# Patient Record
Sex: FEMALE | Race: WHITE | URBAN - METROPOLITAN AREA
[De-identification: names, ages, dates, MRNs, and addresses within clinical notes are randomized per-mention and may not be internally consistent; named-entity substitution may affect disease eponyms.]

---

## 2017-02-24 ENCOUNTER — OFFICE VISIT (OUTPATIENT)
Dept: FAMILY MEDICINE CLINIC | Facility: CLINIC | Age: 32
End: 2017-02-24

## 2017-02-24 VITALS
HEIGHT: 57 IN | DIASTOLIC BLOOD PRESSURE: 84 MMHG | HEART RATE: 97 BPM | WEIGHT: 183 LBS | RESPIRATION RATE: 18 BRPM | BODY MASS INDEX: 39.48 KG/M2 | SYSTOLIC BLOOD PRESSURE: 116 MMHG | OXYGEN SATURATION: 98 % | TEMPERATURE: 99 F

## 2017-02-24 DIAGNOSIS — L02.429: Primary | ICD-10-CM

## 2017-02-24 PROCEDURE — 99203 OFFICE O/P NEW LOW 30 MIN: CPT | Performed by: PHYSICIAN ASSISTANT

## 2017-02-24 RX ORDER — ERGOCALCIFEROL (VITAMIN D2) 10 MCG
TABLET ORAL
COMMUNITY

## 2017-02-24 RX ORDER — CEPHALEXIN 500 MG/1
500 CAPSULE ORAL 3 TIMES DAILY
Qty: 30 CAPSULE | Refills: 0 | Status: SHIPPED | OUTPATIENT
Start: 2017-02-24 | End: 2017-02-24

## 2017-02-24 RX ORDER — CEPHALEXIN 500 MG/1
500 CAPSULE ORAL 3 TIMES DAILY
Qty: 30 CAPSULE | Refills: 0 | Status: SHIPPED | OUTPATIENT
Start: 2017-02-24

## 2017-02-24 NOTE — PROGRESS NOTES
HPI:    Patient ID: Ebenezer Booth is a 36year old female. Pt is a new patient to our clinic. She is 36 weeks pregnant. HPI  Pt presents to clinic with skin lesion of left elbow. First appeared yesterday and came to a head today.  Some pain in her symptoms change or worsen over the weekend. - cephALEXin 500 MG Oral Cap; Take 1 capsule (500 mg total) by mouth 3 (three) times daily. Dispense: 30 capsule; Refill: 0    Pt's questions answered to satisfaction.  She verbalized understanding and is in ag

## 2017-03-12 ENCOUNTER — HOSPITAL (OUTPATIENT)
Dept: OTHER | Age: 32
End: 2017-03-12
Attending: OBSTETRICS & GYNECOLOGY

## 2017-03-13 ENCOUNTER — HOSPITAL (OUTPATIENT)
Dept: OTHER | Age: 32
End: 2017-03-13
Attending: OBSTETRICS & GYNECOLOGY

## 2017-03-13 LAB
ANALYZER ANC (IANC): ABNORMAL
BASE DEFICIT BLDCOA-SCNC: 8 MMOL/L
BASE DEFICIT BLDCOV-SCNC: 8 MMOL/L
BASE EXCESS BLDCOA CALC-SCNC: ABNORMAL MMOL/L
BASE EXCESS-RC: ABNORMAL
BASOPHILS # BLD: 0 THOUSAND/MCL (ref 0–0.3)
BASOPHILS NFR BLD: 0 %
DIFFERENTIAL METHOD BLD: ABNORMAL
EOSINOPHIL # BLD: 0.1 THOUSAND/MCL (ref 0.1–0.5)
EOSINOPHIL NFR BLD: 1 %
ERYTHROCYTE [DISTWIDTH] IN BLOOD: 12.5 % (ref 11–15)
HCO3 BLDCOA-SCNC: 24 MMOL/L (ref 21–28)
HCO3 BLDCOV-SCNC: 21 MMOL/L (ref 22–29)
HEMATOCRIT: 36.6 % (ref 36–46.5)
HGB BLD-MCNC: 12.9 GM/DL (ref 12–15.5)
LYMPHOCYTES # BLD: 1.5 THOUSAND/MCL (ref 1–4.8)
LYMPHOCYTES NFR BLD: 11 %
MCH RBC QN AUTO: 32.7 PG (ref 26–34)
MCHC RBC AUTO-ENTMCNC: 35.2 GM/DL (ref 32–36.5)
MCV RBC AUTO: 92.7 FL (ref 78–100)
MONOCYTES # BLD: 0.9 THOUSAND/MCL (ref 0.3–0.9)
MONOCYTES NFR BLD: 6 %
NEUTROPHILS # BLD: 11.2 THOUSAND/MCL (ref 1.8–7.7)
NEUTROPHILS NFR BLD: 82 %
NEUTS SEG NFR BLD: ABNORMAL %
PCO2 BLDCOA: 86 MM HG (ref 31–74)
PCO2 BLDCOV: 59 MM HG (ref 23–49)
PERCENT NRBC: ABNORMAL
PH BLDCOA: 7.06 UNIT (ref 7.18–7.38)
PH BLDCOV: 7.16 UNIT (ref 7.25–7.45)
PLATELET # BLD: 209 THOUSAND/MCL (ref 140–450)
PO2 BLDCOV: 26 MM HG (ref 17–41)
PO2 RC ARTERIAL CORD (RACPO): 21 MM HG (ref 6–31)
RBC # BLD: 3.95 MILLION/MCL (ref 4–5.2)
WBC # BLD: 13.7 THOUSAND/MCL (ref 4.2–11)

## 2017-03-14 LAB
ANALYZER ANC (IANC): ABNORMAL
ERYTHROCYTE [DISTWIDTH] IN BLOOD: 12.9 % (ref 11–15)
HEMATOCRIT: 32.7 % (ref 36–46.5)
HGB BLD-MCNC: 11.1 GM/DL (ref 12–15.5)
MCH RBC QN AUTO: 31.9 PG (ref 26–34)
MCHC RBC AUTO-ENTMCNC: 33.9 GM/DL (ref 32–36.5)
MCV RBC AUTO: 94 FL (ref 78–100)
PLATELET # BLD: 197 THOUSAND/MCL (ref 140–450)
RBC # BLD: 3.48 MILLION/MCL (ref 4–5.2)
WBC # BLD: 11.6 THOUSAND/MCL (ref 4.2–11)

## 2017-03-21 ENCOUNTER — HOSPITAL (OUTPATIENT)
Dept: OTHER | Age: 32
End: 2017-03-21
Attending: OBSTETRICS & GYNECOLOGY

## 2017-04-01 ENCOUNTER — HOSPITAL (OUTPATIENT)
Dept: OTHER | Age: 32
End: 2017-04-01
Attending: OBSTETRICS & GYNECOLOGY

## 2017-04-17 RX ORDER — METOCLOPRAMIDE 5 MG/1
5 TABLET ORAL
Qty: 90 TABLET | Refills: 1 | Status: SHIPPED | OUTPATIENT
Start: 2017-04-17

## 2017-07-18 ENCOUNTER — TELEPHONE (OUTPATIENT)
Dept: FAMILY MEDICINE CLINIC | Facility: CLINIC | Age: 32
End: 2017-07-18

## 2017-07-18 NOTE — TELEPHONE ENCOUNTER
Spoke with patient, 1-2 soft stools daily \"kade loose\". No abdominal pain, no fever, no blood, no Nausea or vomiting. Lives in [place].  Advised to get established with PCP there, monitor closely and seek evaluation if symptoms increase or worse

## 2017-07-18 NOTE — TELEPHONE ENCOUNTER
Pt called stated she had her appendix removed on May 14th and ever since her vowel movements have been very soft or diarrhea like, pt is concerned she hasn't had a solid vowel movement. Is this normal? Pt will like to further discuss with the nurse.  Please

## 2017-07-18 NOTE — TELEPHONE ENCOUNTER
Pt called back returning a call for the nurse, per triage nurse to take a message and notify pt we will call back. please call pt back and advise.

## 2017-07-18 NOTE — TELEPHONE ENCOUNTER
[facility] requesting a return call. If abdominal pain, fever, consistently loose stools, an evaluation best course.

## 2017-11-27 ENCOUNTER — TELEPHONE (OUTPATIENT)
Dept: FAMILY MEDICINE CLINIC | Facility: CLINIC | Age: 32
End: 2017-11-27

## 2017-11-27 NOTE — TELEPHONE ENCOUNTER
Pt states she is skiing in [facility], and injured knee, saw PT for eval  and requested an order to be faxed,  Pt did not see doctor only therapist.  Heather Melo for order?

## 2017-11-27 NOTE — TELEPHONE ENCOUNTER
CALLING TO ASK FOR A PT ORDER FOR R KNEE INJURY. SHE IS OUT OF STATE AT THE MOMENT AND SHE SPOKE TO A PT OFFICE ([facility])  AND TOLD HER TO GET AN ORDER. STATES THEY SPOKE TO HER OVER THE PHONE REGARDING HER INJURY.     [address] [place] [address]

## 2022-03-19 PROBLEM — Z80.3 FAMILY HISTORY OF BREAST CANCER: Status: ACTIVE | Noted: 2022-02-15

## 2023-01-25 ENCOUNTER — OFFICE VISIT (OUTPATIENT)
Dept: ORTHOPEDIC SURGERY | Age: 38
End: 2023-01-25

## 2023-01-25 ENCOUNTER — TELEPHONE (OUTPATIENT)
Dept: ORTHOPEDIC SURGERY | Age: 38
End: 2023-01-25

## 2023-01-25 DIAGNOSIS — M25.531 RIGHT WRIST PAIN: Primary | ICD-10-CM

## 2023-01-25 RX ORDER — MELOXICAM 15 MG/1
15 TABLET ORAL DAILY
Qty: 28 TABLET | Refills: 0 | Status: SHIPPED | OUTPATIENT
Start: 2023-01-25 | End: 2023-02-22

## 2023-01-25 NOTE — TELEPHONE ENCOUNTER
I returned patient call and let her know that prescription has been sent to her pharmacy. She understands to call back with any other questions or concerns.

## 2023-01-25 NOTE — LETTER
DME Patient Authorization Form    Name: Jammie Martinez  : 1985  MRN: [id number]   Age: 37 y.o.  Gender: female  Delivery Address: [facility]     Diagnosis:     ICD-10-CM    1. Right wrist pain  M25.531 XR WRIST RIGHT (MIN 3 VIEWS)           Requested DME:  Reparel Arm Sleeve** AARM ($35) X 1 - right        Clinical Notes:     **Indicates non-covered items by insurance. Payment expected on date of service.      Electronically signed by  Provider: DEVIN Feliz - CNP__Date: 2023                            [facility] Tax ID # [id number]        Durable Medical Equipment and/or Orthotics Patient Consent    • I understand that my physician has prescribed this medical supply as part of my treatment plan as a matter of Medical Necessity.  • I understand that I have a choice in where I receive my prescribed orthopedic supplies and/or services.  • I authorize [facility] to furnish this service/product and to provide my insurance carrier with any information requested in order to process for payment.  • I instruct my insurance carrier to pay [facility] directly for these services/products.  • I understand that my insurance carrier may deny payment for this supply because it is a non-covered item, deemed not medically necessary or considered experimental.  • I understand that any cost not covered by my insurance carrier will be solely my financial responsibility.  • I have received the Supplier Standards and have reviewed them.  • I have received the prescribed item and have been fully instructed on the proper use of the above services/products.    ______ (Patient Initials) I understand that all DME items are non-returnable after being dispensed. Items still in sealed packaging may be returned up to 14 days after purchasing. [facility]  will replace items that are defective.    ______ (Patient Initials) I understand that [facility] will not file a claim with my insurance carrier for this service/product and I am waiving my right to file a claim on my own for this service/product with my insurance company as this item is NON-COVERED (Denoted by the **) by my Insurance company/policy. ______ (Patient Initials) I understand that I am responsible to bring my equipment to the hospital for any surgery. ______________________________________________  ________________________  Patient / Radha Connors            Thank you for considering [address] Your physician has prescribed specific medical equipment or devices for your home use. The following describes your rights and responsibilities as our customer. Right to Choose Providers: You have a choice regarding which company supplies your home medical equipment and devices, and to consult your physician in this decision. You may choose a medical supply store, a home medical equipment provider, or a specialist such as [facility]. [facility] will coordinate with your physician to provide the medical equipment or devices prescribed for your home use. Right to Service:  You have the right to considerate, respectful and nondiscriminatory care. You have the right to receive accurate and easily understood information about your health care. If you speak a foreign language, or don't understand the discussions, assistance will be provided to allow you to make informed health care decisions. You have the right to know your treatment options and to participate in decisions about your care, including the right to accept or refuse treatment. You have the right to expect a reasonable response to your requests for treatment or service.   You have the right to talk in confidence with health care providers and to have your health care information protected.  You have the right to receive an explanation of your bill.  You have the right to complain about the service or product you receive.    Patient Responsibilities:  Please provide complete and accurate information about your health insurance benefits and make arrangements for the timely payment of your bill.  [facility] will, if possible, assume responsibility for billing your insurance (Medicare, Medicaid and commercial) for the prescribed equipment or devices.   If your policy does not cover the prescribed product, or only covers a portion of the bill, you are responsible for any remaining balance.      Return and Exchange Policy:  [facility] will honor published  Warranties for products.  [facility] will accept returns or exchanges within 14 days from the date of receipt, providin) the product must be in new condition; 2) receipt as required; and 3) used disposable and hygiene products may only be returned due to a defective product. Note: Refunds will be issued in a timely manner, please allow 4-6 weeks for processing.   Complaint Procedures and DME Consumer Protection Resources:  [facility] values you as a customer, and is committed to resolving patient concerns.  This commitment includes understanding and documenting your concerns, conducting a review of internal procedures, and providing you with an explanation and resolution to your concerns.  Should you have any questions about our services or billing process, please contact our office at (practice phone number).  If we are unable to resolve the concern, you have the right to direct comments to the office of Consumer Protection, in the [facility] Department of Health or the  office, without fear of repercussion.    DMEPOS SUPPLIER STANDARDS    A supplier must be in compliance with all applicable Federal and State licensure and regulatory requirements.  A supplier  must provide complete and accurate information on the DMEPOS supplier application. Any changes to this information must be reported to the [facility] within 30 days. An authorized individual (one whose signature is binding) must sign the application for billing privileges. A supplier must fill orders from its own inventory, or must contract with other companies for the purchase of items necessary to fill the order. A supplier may not contract with any entity that is currently excluded from the Medicare program, any [facility], [place] program, or from any other Federal procurement or Nonprocurement programs. A supplier must advise beneficiaries that they may rent or purchase inexpensive or routinely purchased durable medical equipment, and of the purchase option for capped rental equipment. A supplier must notify beneficiaries of warranty coverage and honor all warranties under applicable State Law, and repair or replace free of charge Medicare covered items that are under warranty. A supplier must maintain a physical facility on an appropriate site. A supplier must permit CMS, or its agents to conduct on-site inspections to ascertain the supplier's compliance with these standards. The supplier location must be accessible to beneficiaries during reasonable business hours, and must maintain a visible sign and posted hours of operation. A supplier must maintain a primary business telephone listed under the name of the business in a [employer] or a toll free number available through directory assistance. The exclusive use of a beeper, answering machine or cell phone is prohibited. A supplier must have comprehensive liability insurance in the amount of at least $300,000 that covers both the supplier's place of business and all customers and employees of the supplier.   If the supplier manufactures its own items, this insurance must also cover product liability and completed operations. A supplier must agree not to initiate telephone contact with beneficiaries, with a few exceptions allowed. This standard prohibits suppliers from calling beneficiaries in order to solicit new business. A supplier is responsible for delivery and must instruct beneficiaries on use of Medicare covered items, and maintain proof of delivery. A supplier must answer questions, and respond to complaints of the beneficiaries, and maintain documentation of such contacts. A supplier must maintain and replace at no charge or repair directly, or through a service contract with another company, Medicare covered items it has rented to beneficiaries. A supplier must accept returns of substandard (less than full quality for the particular item) or unsuitable items (inappropriate for the beneficiary at the time it was fitted and rented or sold) from beneficiaries. A supplier must disclose these supplier standards to each beneficiary to whom it supplies a Medicare-covered item. A supplier must disclose to the government any person having ownership, financial, or control interest in the supplier. A supplier must not convey or reassign a supplier number; i.e., the supplier may not sell or allow another entity to use its Medicare billing number. A supplier must have a complaint resolution protocol established to address beneficiary complaints that relate to these standards. A record of these complaints must be maintained at the physical facility. Complaint records must include: the name, address, telephone number and health insurance claim number of the beneficiary, a summary of the complaint, and any action taken to resolve it. A supplier must agree to furnish CMS any information required by the Medicare statute and implementing regulations. A supplier of DMEPOS and other items and services must be accredited by a CMS-approved accreditation organization in order to receive and retain a supplier billing number. The accreditation must indicate the specific products and services, for which the supplier is accredited in order for the supplier to receive payment for those specific products and services. A DMEPOS supplier must notify their accreditation organization when a new DMEPOS location is opened. The accreditation organization may accredit the new supplier location for three months after it is operational without requiring a new site visit. All DMEPOS supplier locations, whether owned or subcontracted, must meet the [employer] and be separately accredited in order to bill Medicare. An accredited supplier may be denied enrollment or their enrollment may be revoked, if CMS determines that they are not in compliance with the DMEPOS quality standards. A DMEPOS supplier must disclose upon enrollment all products and services, including the addition of new product lines for which they are seeking accreditation. If a new product line is added after enrollment, the DMEPOS supplier will be responsible for notifying the accrediting body of the new product so that the DMEPOS supplier can be re-surveyed and accredited for these new products. Must meet the surety bond requirements specified in 42 C. F.R. 424.57(c). Implementation date- May 4, 2009. A supplier must obtain oxygen from a state-licensed oxygen supplier. A supplier must maintain ordering and referring documentation consistent with provisions found in 42 C. F.R. 424.516(f). DMEPOS suppliers are prohibited from sharing a practice location with certain other Medicare providers and suppliers. DMEPOS suppliers must remain open to the public for a minimum of 30 hours per week with certain exceptions.

## 2023-01-25 NOTE — PROGRESS NOTES
The patient was prescribed and fitted with a Reparel arm sleeve for the right arm. She was also given a Reparel wrist sleeve to cover the area in the wrist that was bothering her. Both sizes Small. Patient read and signed documenting they understand and agree to [facility]'s current DME return policy.

## 2023-01-25 NOTE — PROGRESS NOTES
Orthopaedic Hand Clinic Note    Name: Fatoumata Ibarra  YOB: 1985  Gender: female  MRN: [id number]      CC: Patient referred for evaluation of right upper extremity pain    HPI: Fatoumata Ibarra is a 30 y.o. female right hand dominant with a chief complaint of right forearm and wrist pain. She reports a neighbors dog bit her on January 6, 2023. She said the dog was vaccinated. She was up-to-date on tetanus shot. She said she washed the wound. She said she had continued pain so she was seen at urgent care on January 20, 2023. No x-rays were obtained. She was told to take anti-inflammatories as needed. She notes pain with cooking or cleaning. She is a stay-at-home mom. She complains of aching from the elbow down to the wrist.  She does complain of some tingling. ROS/Meds/PSH/PMH/FH/SH: I personally reviewed the patients standard intake form. Pertinents are discussed in the HPI    Physical Examination:  General: Awake and alert. HEENT: Normocephalic, atraumatic  CV/Pulm: Breathing even and unlabored  Skin: No obvious rashes noted. Lymphatic: No obvious evidence of lymphedema or lymphadenopathy    Musculoskeletal Exam:  Examination on the right upper extremity demonstrates cap refill < 5 seconds in all fingers  Patient has mild swelling to the right upper extremity. She has a healing wound on the ulnar aspect of her right wrist.  There is a scab in place. There is no erythema or drainage. She has full range of motion to the right elbow and right wrist.  She is tender to palpation diffusely throughout the forearm. Her compartments are soft. She able to make a composite fist.  She has full extension of her digits. She is neurovascular intact with good capillary refill. Imaging / Electrodiagnostic Tests:     X-rays of her right wrist, 3 views, are reviewed. No fractures noted. Assessment:   1.  Right wrist pain        Plan:   We discussed the diagnosis and different treatment options. We discussed observation, therapy, antiinflammatory medications and other pertinent treatment modalities. After discussing in detail the patient elects to proceed with anti-inflammatories. She is very sensitive to steroids and does not wish to take these. We will give her a compressive sleeve. I will reassess her progress back in 4 weeks. .     Patient voiced accordance and understanding of the information provided and the formulated plan. All questions were answered to the patient's satisfaction during the encounter.     4 This is an acute complicated injury  Treatment at this time: Prescription medication and Brace    DEVIN Holman - CNP  Orthopaedic Surgery  01/25/23  3:16 PM

## 2023-02-03 ENCOUNTER — TELEPHONE (OUTPATIENT)
Dept: ORTHOPEDIC SURGERY | Age: 38
End: 2023-02-03

## 2023-02-03 DIAGNOSIS — M25.531 RIGHT WRIST PAIN: Primary | ICD-10-CM

## 2023-02-03 RX ORDER — IBUPROFEN 800 MG/1
800 TABLET ORAL 2 TIMES DAILY PRN
Qty: 60 TABLET | Refills: 0 | Status: SHIPPED | OUTPATIENT
Start: 2023-02-03 | End: 2023-03-03

## 2023-02-03 NOTE — TELEPHONE ENCOUNTER
I returned patient call- Daisy Campbell is going to send a prescription for ibuprofen. Patient understands to call back with any other questions or concerns.

## 2023-02-03 NOTE — TELEPHONE ENCOUNTER
She thinks the Meloxicam is helping with swelling but feels it is putting her in a depressive state. She stopped it for two days and feels better. She doesn't want steroids but wonders if you can change the meloxicam to something else. Please let her know.

## 2023-02-24 ENCOUNTER — TELEPHONE (OUTPATIENT)
Dept: ORTHOPEDIC SURGERY | Age: 38
End: 2023-02-24

## 2023-02-24 NOTE — TELEPHONE ENCOUNTER
She understood that in six weeks she would be pretty much back to normal. She is still experiencing a lot of pain in different things that she does through out the day. Should she come back in or can someone call and talk about this with her.

## 2023-02-24 NOTE — TELEPHONE ENCOUNTER
I returned patient call and scheduled an appointment for her to come in to see Kimberley Villa NP. She understands to call back with any other questions or concerns.

## 2023-03-01 ENCOUNTER — OFFICE VISIT (OUTPATIENT)
Dept: ORTHOPEDIC SURGERY | Age: 38
End: 2023-03-01
Payer: COMMERCIAL
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
## 2023-03-01 DIAGNOSIS — M25.531 RIGHT WRIST PAIN: Primary | ICD-10-CM

## 2023-03-01 PROCEDURE — 99214 OFFICE O/P EST MOD 30 MIN: CPT | Performed by: NURSE PRACTITIONER

## 2023-03-01 RX ORDER — IBUPROFEN 800 MG/1
800 TABLET ORAL 2 TIMES DAILY PRN
Qty: 60 TABLET | Refills: 0 | Status: SHIPPED | OUTPATIENT
Start: 2023-03-01 | End: 2023-03-29

## 2023-03-01 NOTE — PROGRESS NOTES
Orthopaedic Hand Clinic Note    Name: Kip Zendejas  YOB: 1985  Gender: female  MRN: [id number]      Follow up visit:   1. Right wrist pain    2. Dog bite, initial encounter        HPI: Kip Zendejas is a 30 y.o. female who is following up for dog bite right wrist.  Her injury was approximately 2 months ago. She said she is doing much better than her last visit however is not at 100%. She said she is able to do every day activities such as cleaning and cooking without pain. Her wrist bothers her with yoga, weightlifting, bike riding. She says it is \"uncomfortable\". She has been taking the ibuprofen. ROS/Meds/PSH/PMH/FH/SH: I personally reviewed the patients standard intake form. Pertinents are discussed in the HPI    Physical Examination:    Musculoskeletal Examination:  Examination on the right upper extremity demonstrates cap refill < 5 seconds in all fingers  Patient is now pain-free with full range of motion to her elbow. She has no pain throughout her forearm. She is still slightly tender over the ulnar side of her wrist with full flexion and full extension. She is able to make a composite fist.  She has normal range of motion. She is neurovascular intact with good capillary refill. Imaging / Electrodiagnostic Tests:     None    Assessment:     ICD-10-CM    1. Right wrist pain  M25.531 Ambulatory referral to Occupational Therapy     ibuprofen (ADVIL;MOTRIN) 800 MG tablet      2. Dog bite, initial encounter  W54. 0XXA Ambulatory referral to Occupational Therapy     ibuprofen (ADVIL;MOTRIN) 800 MG tablet          Plan:   We discussed the diagnosis and different treatment options. We discussed observation, therapy, antiinflammatory medications and other pertinent treatment modalities. After discussing in detail the patient elects to proceed with continuing with ibuprofen. We will set her up for therapy. We will reassess her progress back in 6 to 8 weeks. She is cleared for full activity with no restrictions. .     Patient voiced accordance and understanding of the information provided and the formulated plan. All questions were answered to the patient's satisfaction during the encounter.     4 This is an acute complicated injury  Treatment at this time: Prescription medication and therapy    DEVIN Anand - CNP  Orthopaedic Surgery  03/01/23  1:07 PM

## 2023-03-15 ENCOUNTER — TELEPHONE (OUTPATIENT)
Dept: ORTHOPEDIC SURGERY | Age: 38
End: 2023-03-15

## 2023-03-15 DIAGNOSIS — M25.531 RIGHT WRIST PAIN: Primary | ICD-10-CM

## 2023-03-15 NOTE — TELEPHONE ENCOUNTER
She is asking for an email copy of her PT order. [email or web address]. If this can't be done please let her know.

## 2023-03-15 NOTE — TELEPHONE ENCOUNTER
Therapy order has been faxed to AT and emailed via ([employer]) to the patient at her request. She understands to call back with any other questions or concerns.

## 2023-03-27 ENCOUNTER — TELEPHONE (OUTPATIENT)
Dept: ORTHOPEDIC SURGERY | Age: 38
End: 2023-03-27

## 2023-03-27 NOTE — TELEPHONE ENCOUNTER
Patient has contacted hand center for 2nd opinion. MRI will not be ordered at this time per [facility]. Patient was doing better at time of last visit and No follow up visits have been scheduled by patient at this time.

## 2023-03-27 NOTE — TELEPHONE ENCOUNTER
Her physical therapist thinks there is more going on with her wrist and is recommended that she get a MRI. Can you order this for her? Please let her know your thoughts on this.

## 2023-03-30 ENCOUNTER — OFFICE VISIT (OUTPATIENT)
Dept: ORTHOPEDIC SURGERY | Age: 38
End: 2023-03-30
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
## 2023-03-30 DIAGNOSIS — M25.531 RIGHT WRIST PAIN: ICD-10-CM

## 2023-03-30 DIAGNOSIS — S69.81XA INJURY OF TRIANGULAR FIBROCARTILAGE COMPLEX (TFCC) OF RIGHT WRIST, INITIAL ENCOUNTER: Primary | ICD-10-CM

## 2023-03-30 NOTE — PROGRESS NOTES
Patient was fitted and instructed on an  Wrist Splint for patients right wrist. Patient is aware the hand slides in the brace with the thumb placed threw the thumb hole. I demonstrated the correct way to tighten the brace straps to allow for a comfortable fit. Patient understood the correct way to wear the brace. Patient read and signed documenting they understand and agree to [facility]'s current DME return policy.

## 2023-03-30 NOTE — PROGRESS NOTES
Orthopaedic Hand Clinic Note    Name: Guerita Parra  YOB: 1985  Gender: female  MRN: [id number]      Follow up visit:   1. Injury of triangular fibrocartilage complex (TFCC) of right wrist, initial encounter    2. Right wrist pain    3. Dog bite, initial encounter        HPI: Guerita Parra is a 30 y.o. female who is following up for right wrist pain. Her injury was 3 months ago. She has been doing therapy the last 3 weeks. She said her therapist has noted swelling. She has tried exercises and massage techniques. She has been taped with kinesiology tape. She said her therapist reports decreased motion and decreased strength compared to the contralateral side. She said she is more painful than she was when she was here 4 weeks ago. She feels that she is regressing. She feels that she is more painful after therapy. ROS/Meds/PSH/PMH/FH/SH: I personally reviewed the patients standard intake form. Pertinents are discussed in the HPI    Physical Examination:    Musculoskeletal Examination:  Examination on the right upper extremity demonstrates cap refill < 5 seconds in all fingers  Patient has mild swelling to the right wrist.  She is slightly tender over the ulnar side of her wrist with full flexion and full extension. She is able to make a composite fist.  She has normal range of motion, but with discomfort  She is neurovascularly intact with good capillary refill    Imaging / Electrodiagnostic Tests:     none    Assessment:     ICD-10-CM    1. Injury of triangular fibrocartilage complex (TFCC) of right wrist, initial encounter  S69.81XA MRI WRIST RIGHT WO CONTRAST     Ambulatory Referral to DME      2. Right wrist pain  M25.531       3. Dog bite, initial encounter  W54. 0XXA           Plan:   We discussed the diagnosis and different treatment options. We discussed observation, therapy, antiinflammatory medications and other pertinent treatment modalities.     After

## 2024-01-11 ENCOUNTER — OFFICE VISIT (OUTPATIENT)
Dept: OBGYN CLINIC | Age: 39
End: 2024-01-11
Payer: COMMERCIAL

## 2024-01-11 VITALS
DIASTOLIC BLOOD PRESSURE: 74 MMHG | HEIGHT: 67 IN | SYSTOLIC BLOOD PRESSURE: 128 MMHG | BODY MASS INDEX: 24.01 KG/M2 | WEIGHT: 153 LBS

## 2024-01-11 DIAGNOSIS — Z12.4 ENCOUNTER FOR PAPANICOLAOU SMEAR FOR CERVICAL CANCER SCREENING: ICD-10-CM

## 2024-01-11 DIAGNOSIS — Z12.31 ENCOUNTER FOR SCREENING MAMMOGRAM FOR BREAST CANCER: ICD-10-CM

## 2024-01-11 DIAGNOSIS — Z80.3 FAMILY HISTORY OF BREAST CANCER: ICD-10-CM

## 2024-01-11 DIAGNOSIS — Z30.431 ENCOUNTER FOR ROUTINE CHECKING OF INTRAUTERINE CONTRACEPTIVE DEVICE: ICD-10-CM

## 2024-01-11 DIAGNOSIS — Z01.419 ENCOUNTER FOR GYNECOLOGICAL EXAMINATION (GENERAL) (ROUTINE) WITHOUT ABNORMAL FINDINGS: Primary | ICD-10-CM

## 2024-01-11 DIAGNOSIS — Z80.8 FAMILY HISTORY OF PERITONEAL CANCER: ICD-10-CM

## 2024-01-11 PROCEDURE — 99395 PREV VISIT EST AGE 18-39: CPT | Performed by: OBSTETRICS & GYNECOLOGY

## 2024-01-11 RX ORDER — LEVONORGESTREL 52 MG/1
1 INTRAUTERINE DEVICE INTRAUTERINE ONCE
COMMUNITY

## 2024-01-11 SDOH — ECONOMIC STABILITY: HOUSING INSECURITY
IN THE LAST 12 MONTHS, WAS THERE A TIME WHEN YOU DID NOT HAVE A STEADY PLACE TO SLEEP OR SLEPT IN A SHELTER (INCLUDING NOW)?: NO

## 2024-01-11 SDOH — ECONOMIC STABILITY: FOOD INSECURITY: WITHIN THE PAST 12 MONTHS, YOU WORRIED THAT YOUR FOOD WOULD RUN OUT BEFORE YOU GOT MONEY TO BUY MORE.: NEVER TRUE

## 2024-01-11 SDOH — ECONOMIC STABILITY: FOOD INSECURITY: WITHIN THE PAST 12 MONTHS, THE FOOD YOU BOUGHT JUST DIDN'T LAST AND YOU DIDN'T HAVE MONEY TO GET MORE.: NEVER TRUE

## 2024-01-11 SDOH — ECONOMIC STABILITY: INCOME INSECURITY: HOW HARD IS IT FOR YOU TO PAY FOR THE VERY BASICS LIKE FOOD, HOUSING, MEDICAL CARE, AND HEATING?: NOT HARD AT ALL

## 2024-01-11 ASSESSMENT — PATIENT HEALTH QUESTIONNAIRE - PHQ9
SUM OF ALL RESPONSES TO PHQ9 QUESTIONS 1 & 2: 0
2. FEELING DOWN, DEPRESSED OR HOPELESS: 0
SUM OF ALL RESPONSES TO PHQ QUESTIONS 1-9: 0
1. LITTLE INTEREST OR PLEASURE IN DOING THINGS: 0

## 2024-01-11 NOTE — PROGRESS NOTES
[facility]  [address], [place] [address]  [phone]    Liv Goldstein MD, FACOG  Meliton Hatfield MD, FACOG  REGINA Muñoz-BC    Assessment/Plan     Jammie was seen today for annual exam.    Diagnoses and all orders for this visit:    Encounter for gynecological examination (general) (routine) without abnormal findings  Comments:  - pap done  - self breast awareness encouraged  - early mammogram due to fhx   - PCP Dave BELTRAN-NP    Family history of breast cancer  Comments:  - mother and PGM with breast cancer  - would like genetic counseling/testing--> referral given    Orders:  -     [facility] (Genetics)    Family history of peritoneal cancer  Comments:  - mother h/o primary peritoneal cancer  Orders:  -     [facility] (Genetics)    Encounter for routine checking of intrauterine contraceptive device  Comments:  - IUD appropriate position on exam  - inserted     Encounter for Papanicolaou smear for cervical cancer screening  -     PAP IG, Aptima HPV and rfx 16/18,45 (); Future  -     PAP IG, Aptima HPV and rfx 16/18,45 ()    Encounter for screening mammogram for breast cancer  -     [facility] LIAT DIGITAL SCREEN BILATERAL; Future       F/u 1 year or prn problems     Subjective     Jammie Martinez  38 y.o.  presents today for annual Gyn exam.  No gyn complaints. She was bit by her neighbors dog on her R arm last year and has had difficulty with pain and function in the arm. Has a Mirena IUD inserted 19, has very light spotting every 2 months. No abnormal discharge. No pain/bleeding with sex. She has normal BM, no urinary issues.     Fhx of breast and primary peritoneal cancer with her mother (dx 47, ). PGM with likely breast cancer dx in her 30s. Her mother had neg genetic testing. She would like to have genetic counseling herself. Also interested in mammogram/breast imaging.

## 2024-01-11 NOTE — PATIENT INSTRUCTIONS
Follow up as needed or next year for your yearly female exam.  Thanks for coming to see us today and letting us take care of you!

## 2024-01-11 NOTE — PROGRESS NOTES
Patient comes in today for annual exam. Discuss patient getting mammogram due to mom having breast cancer.     LAST PAP:  02/15/2022, Negative HPV Negative    LAST MAMMO:  Never    LMP:  Patient's last menstrual period was 12/29/2023 (approximate).    BIRTH CONTROL:  IUD    TOBACCO USE:  No    FAMILY HISTORY OF:   Breast Cancer:  Yes   Ovarian Cancer:  No   Uterine Cancer:  No   Colon Cancer:  No    Vitals:    01/11/24 1156   BP: 128/74   Site: Left Upper Arm   Position: Sitting   Weight: 69.4 kg (153 lb)   Height: 1.702 m (5' 7\")        Alecia Collier MA  01/11/24  12:09 PM

## 2024-01-18 LAB
COLLECTION METHOD: NORMAL
CYTOLOGIST CVX/VAG CYTO: NORMAL
CYTOLOGY CVX/VAG DOC THIN PREP: NORMAL
HPV APTIMA: NEGATIVE
HPV GENOTYPE REFLEX: NORMAL
Lab: NORMAL
OTHER PT INFO: NORMAL
PAP SOURCE: NORMAL
PATH REPORT.FINAL DX SPEC: NORMAL
PREV TREATMENT: NORMAL
STAT OF ADQ CVX/VAG CYTO-IMP: NORMAL

## 2024-02-19 ENCOUNTER — OFFICE VISIT (OUTPATIENT)
Dept: INTERNAL MEDICINE CLINIC | Facility: CLINIC | Age: 39
End: 2024-02-19
Payer: COMMERCIAL

## 2024-02-19 VITALS — WEIGHT: 154 LBS | BODY MASS INDEX: 24.12 KG/M2 | DIASTOLIC BLOOD PRESSURE: 70 MMHG | SYSTOLIC BLOOD PRESSURE: 122 MMHG

## 2024-02-19 DIAGNOSIS — M25.531 RIGHT WRIST PAIN: Primary | ICD-10-CM

## 2024-02-19 PROCEDURE — 99204 OFFICE O/P NEW MOD 45 MIN: CPT | Performed by: PHYSICIAN ASSISTANT

## 2024-02-19 ASSESSMENT — PATIENT HEALTH QUESTIONNAIRE - PHQ9
2. FEELING DOWN, DEPRESSED OR HOPELESS: 0
1. LITTLE INTEREST OR PLEASURE IN DOING THINGS: 0
SUM OF ALL RESPONSES TO PHQ9 QUESTIONS 1 & 2: 0
SUM OF ALL RESPONSES TO PHQ QUESTIONS 1-9: 0

## 2024-02-19 NOTE — PROGRESS NOTES
PROGRESS NOTE    Chief Complaint   Patient presents with    New Patient     Here to get established    Wrist Pain     Right wrist pain for over a year now. Wearing compression sleeve which helps        HPI  HPI    Past Medical History, Past Surgical History, Family history, Social History, and Medications were all reviewed with the patient today and updated as necessary.       Current Outpatient Medications   Medication Sig Dispense Refill    levonorgestrel (MIRENA, 52 MG,) IUD 52 mg 1 each by IntraUTERine route once       No current facility-administered medications for this visit.     Allergies   Allergen Reactions    Latex Rash    Budesonide Rash    Hydrocortisone Rash    Tixocortol Rash     Patient Active Problem List   Diagnosis    Family history of breast cancer    Right wrist pain    Dog bite    Injury of triangular fibrocartilage complex of right wrist     Past Medical History:   Diagnosis Date    HPV (human papilloma virus) infection 03/06/2020    Pap negative, HPV positive (not 16/18/45), performed at [facility], records scanned     Past Surgical History:   Procedure Laterality Date    ADENOIDECTOMY      as a child    APPENDECTOMY  5/20/2017     Family History   Problem Relation Age of Onset    Breast Cancer Mother     Cancer Mother         peritoneal cancer    No Known Problems Father     No Known Problems Brother     Colon Cancer Maternal Grandfather         ?possible colon cancer    Breast Cancer Paternal Grandmother         ?possible breast cancer    Ovarian Cancer Neg Hx     Uterine Cancer Neg Hx      Social History     Tobacco Use    Smoking status: Never    Smokeless tobacco: Never   Substance Use Topics    Alcohol use: Yes     Alcohol/week: 5.0 standard drinks of alcohol     Types: 5 Glasses of wine per week         ROS  Review of Systems      OBJECTIVE:  /70   Wt 69.9 kg (154 lb)   BMI 24.12 kg/m²      PHYSICAL EXAM  Physical Exam     Medical problems and test results were

## 2024-02-28 ENCOUNTER — HOSPITAL ENCOUNTER (OUTPATIENT)
Dept: MAMMOGRAPHY | Age: 39
Discharge: HOME | End: 2024-03-02
Attending: OBSTETRICS & GYNECOLOGY
Payer: COMMERCIAL

## 2024-02-28 DIAGNOSIS — Z12.31 ENCOUNTER FOR SCREENING MAMMOGRAM FOR BREAST CANCER: ICD-10-CM

## 2024-02-28 PROCEDURE — 77063 BREAST TOMOSYNTHESIS BI: CPT

## 2024-04-01 ENCOUNTER — HOSPITAL ENCOUNTER (OUTPATIENT)
Dept: MAMMOGRAPHY | Age: 39
Discharge: HOME | End: 2024-04-04
Attending: OBSTETRICS & GYNECOLOGY
Payer: COMMERCIAL

## 2024-04-01 DIAGNOSIS — R92.8 ABNORMAL SCREENING MAMMOGRAM: ICD-10-CM

## 2024-04-01 PROCEDURE — 76882 US LMTD JT/FCL EVL NVASC XTR: CPT

## 2024-04-01 PROCEDURE — G0279 TOMOSYNTHESIS, MAMMO: HCPCS

## 2024-06-11 ENCOUNTER — TELEPHONE (OUTPATIENT)
Dept: OBGYN CLINIC | Age: 39
End: 2024-06-11

## 2024-06-11 DIAGNOSIS — Z12.39 BREAST CANCER SCREENING, HIGH RISK PATIENT: Primary | ICD-10-CM

## 2024-06-11 NOTE — TELEPHONE ENCOUNTER
Please let patient know that her mammogram and breast US were normal but her Tyrer-Cuzick score (based off of family history, breast density, and other reproductive markers like age of first period, age of first child etc) showed an elevated lifetime risk of breast cancer. Her risk was 31% compared to general population risk of 11%.     When you have a risk > 20%, you qualify for a breast MRI to be done for additional screening. The breast MRI is typically covered by insurance in a higher- risk patient but the cost to you would vary based off of your plan and deductible etc. I encouraged all patients to check with their insurance. Typically we do imaging every 6 months (mammogram, then 6 months later MRI, then 6 mo later mammogram etc). Would she like to me order a breast MRI for October? Thank you?

## 2024-06-11 NOTE — TELEPHONE ENCOUNTER
Dr. Goldstein,    Patient has not read [employer] message.    Called patient, reviewed normal mammo and US result with patient and went over T-C score and how it is calculated.     Reviewed recommendations based on scoring.     Patient confirms wanting breast MRI order and will contact her insurance to verify billing/cost info.

## 2024-08-13 NOTE — PROGRESS NOTES
Occasionally    Surgical history:  Hysterectomy: N/A  Bilateral salpingo oophorectomy: N/A  Appendectomy: 2017  Adenoidectomy     Family History   A detailed three-generation family history was taken today.  Jammie reported the following family history of cancer:   Breast cancer  Mother, diagnosed at age 46 ()   Paternal great-aunt ()   Peritoneal cancer   Mother, diagnosed at age 48 ()    Brain Tumor  Maternal uncle, diagnosed in his 50's   Colon Cancer  Maternal grandfather, diagnosed in his 60's ()   Lung Cancer  Paternal great-uncle ()   Abdominal tumors  Paternal grandmother, diagnosed at age 26 ()    Jammie's mother had testing done in  to determine if she had hereditary cancer and it was reported to be non-hereditary.    There is no known Ashkenazi Jew ancestry. There is no report of consanguinity. The history is by Jammie's report solely, and our counseling and risk assessment is based on the accuracy of this provided history. Should the information collected be found to be changed or different, our original assessment and recommendations may change. A copy of the pedigree can be found in media manager.     Risk Assessment and Discussion   We discussed that there are several models used to predict an individual’s risk to develop breast cancer, including the Sabina, Woody, BRCA Pro, and Tyrer Cuzick models.  These models take into account family history, previous personal biopsy history, and hormonal factors; some take into account only one factor while others look at all three factors. Jammie’s risk to develop breast cancer in her lifetime was estimated to be 31% (Raya Daniels), which is significantly elevated above the general population risk of 12% (or 1 in 8).  According to the American Cancer Society, individuals with a lifetime risk of breast cancer of 20% or greater are candidates not only for annual mammogram but also annual breast

## 2024-08-20 ENCOUNTER — TELEMEDICINE (OUTPATIENT)
Dept: ONCOLOGY | Age: 39
End: 2024-08-20

## 2024-08-20 DIAGNOSIS — Z80.8 FAMILY HISTORY OF PERITONEAL CANCER: ICD-10-CM

## 2024-08-20 DIAGNOSIS — Z80.3 FAMILY HISTORY OF BREAST CANCER: Primary | ICD-10-CM

## 2024-10-10 DIAGNOSIS — Z80.3 FAMILY HISTORY OF BREAST CANCER: Primary | ICD-10-CM

## 2024-10-10 DIAGNOSIS — Z80.8 FAMILY HISTORY OF PERITONEAL CANCER: ICD-10-CM

## 2024-10-10 NOTE — PROGRESS NOTES
Returned missed call from Jammie. She had left a message indicating that she was interested in pursuing genetic testing. Please see initial visit from 8/20/24 for details. Jammie consented to genetic testing for a hereditary cancer predisposition through [facility] by phone today. The panel chosen is called [employer] and analyzes 71 genes that are commonly associated with hereditary cancer susceptibility syndromes. We discussed that [facility] will bill Jammie's insurance directly, and that the laboratory will contact Jammie directly in order to discuss estimated out of pocket cost, if over $100, for the testing. If the patient is not comfortable with the out of pocket cost communicated, Jammie would have the option to utilize financial assistance or self-pay pricing (~$250). I encouraged Jammie to respond as soon as possible if she was contacted, otherwise the lab may default to billing insurance. Jammie's blood sample will be sent to [facility] on 10/14 and results are expected 3 weeks after the sample is received.  Once results are returned by the laboratory, I will reach out to the patient to deliver them.    Jennifer Mckeon, MS  Genetic Counselor

## 2024-10-14 ENCOUNTER — HOSPITAL ENCOUNTER (OUTPATIENT)
Dept: LAB | Age: 39
Discharge: HOME | End: 2024-10-14
Payer: COMMERCIAL

## 2024-10-14 DIAGNOSIS — Z80.8 FAMILY HISTORY OF PERITONEAL CANCER: ICD-10-CM

## 2024-10-14 DIAGNOSIS — Z80.3 FAMILY HISTORY OF BREAST CANCER: ICD-10-CM

## 2024-10-14 LAB
COLLECTION INFORMATION: NORMAL
DATE SENT TO REF LAB: NORMAL
Lab: NORMAL

## 2024-10-14 PROCEDURE — 36415 COLL VENOUS BLD VENIPUNCTURE: CPT

## 2024-10-16 ENCOUNTER — TELEPHONE (OUTPATIENT)
Dept: OBGYN CLINIC | Age: 39
End: 2024-10-16

## 2024-10-16 NOTE — TELEPHONE ENCOUNTER
Called pt, message below reviewed with pt, pt stated contrast can have long term effects and does not want to have that yearly. Pt stated she does have allergies like when she ouches different chemicals she gets a skin reaction so does not want to put that in her body and would like something more natural and better for long term health, advised pt I will need to send Dr. Goldstein a message for recommendations.

## 2024-10-17 NOTE — TELEPHONE ENCOUNTER
Called pt, message below reviewed with pt, pt stated for now she may cancel the MRI. She stated she just got the genetic blood work done to see how much of a risk she is for breast cancer. Pt stated she does not think she wants to do the MRI every year since the contrast can cause long term effects. Informed pt Dr. Goldstein does not want her to get the MRI done w/out contrast because it could cause a false result since the breast tissue may not be seen clearly. Pt voiced understanding she will do research and discuss this with Dr. Goldstein at her next visit.

## 2024-11-06 ENCOUNTER — CLINICAL DOCUMENTATION (OUTPATIENT)
Dept: ONCOLOGY | Age: 39
End: 2024-11-06

## 2024-11-06 NOTE — PROGRESS NOTES
in order to try and clarify VUS results. Application to take part in this program is not a guarantee that your family would be approved to participate, and participation in the program is not a guarantee that your variant would be reclassified. If you have more questions about this program, please feel free to reach out.      I have included a copy of your results with this letter. I have also forwarded a copy of this letter to Dr. Goldstein to help coordinate your ongoing surveillance and care.  A copy of your test report is attached to your records for your [facility] providers to view. It was a pleasure to speak with you, please reach out if you have any questions or would like to arrange for an appointment to go over these results in more detail.    Sincerely,   Jennifer Mckeon, MS  Genetic Counselor   [phone]    Sources used:  National Comprehensive Cancer Network (2024). Breast Cancer Screening and Diagnosis (version 2.2024). Retrieved from https://www.nccn.org/professionals/physician_gls/pdf/breast-screening.pdf  National Comprehensive Cancer Network (2024). Colorectal Cancer Screening (version 1.2024). Retrieved from https://www.nccn.org/professionals/physician_gls/pdf/colorectal_screening.pdf  Radha Leigh, PhD, Ariela Moreno MD, Corbin Woodard MD, Ethel Torres MD, Betty Ga, PhD, Ave Santizo MD, Jose Faustin MD, MPH, Fior Couch MD, David Campos MD, MPH, Garland Rodrigez MD, Javan Osei MD, PhD, MS, Arpan Ojeda Jr, MD, Abrahan Lewis MD, Matthew Smart MD, Pillo Preciado DrPH, Abrahan Ray MD, Abrahan Lundberg MD, Nestor Carrillo, PhD, MPH, Vimal Mansfield MD, MPH, American Cancer Society, American Society for Colposcopy and Cervical Pathology, and American Society for Clinical Pathology Screening Guidelines for the Prevention and Early Detection of Cervical Cancer, American Journal of Clinical Pathology, Volume 137, Issue 4, April

## (undated) NOTE — MR AVS SNAPSHOT
[address]  [address] [address]  [phone]               Thank you for choosing us for your health care visit with Mairel Busby, [address].   We are glad to serve you and happy to provide you with this your Zip Code and Date of Birth to complete the sign-up process. If you do not sign up before the expiration date, you must request a new code.     Your unique [employer] Access Code: [email or web address]  Expires: 4/25/2017 10:32 AM    If you have questions, you can c